# Patient Record
Sex: MALE | Race: WHITE | NOT HISPANIC OR LATINO | Employment: OTHER | URBAN - METROPOLITAN AREA
[De-identification: names, ages, dates, MRNs, and addresses within clinical notes are randomized per-mention and may not be internally consistent; named-entity substitution may affect disease eponyms.]

---

## 2017-11-14 ENCOUNTER — HOSPITAL ENCOUNTER (EMERGENCY)
Facility: HOSPITAL | Age: 22
Discharge: HOME | End: 2017-11-14
Attending: EMERGENCY MEDICINE
Payer: OTHER GOVERNMENT

## 2017-11-14 VITALS
HEIGHT: 67 IN | TEMPERATURE: 98 F | BODY MASS INDEX: 21.97 KG/M2 | RESPIRATION RATE: 16 BRPM | DIASTOLIC BLOOD PRESSURE: 73 MMHG | SYSTOLIC BLOOD PRESSURE: 132 MMHG | WEIGHT: 140 LBS | HEART RATE: 75 BPM | OXYGEN SATURATION: 100 %

## 2017-11-14 DIAGNOSIS — R42 DIZZINESS: ICD-10-CM

## 2017-11-14 DIAGNOSIS — H81.10 BENIGN PAROXYSMAL POSITIONAL VERTIGO, UNSPECIFIED LATERALITY: Primary | ICD-10-CM

## 2017-11-14 PROCEDURE — 93005 ELECTROCARDIOGRAM TRACING: CPT

## 2017-11-14 PROCEDURE — 93010 ELECTROCARDIOGRAM REPORT: CPT | Mod: ,,, | Performed by: INTERNAL MEDICINE

## 2017-11-14 PROCEDURE — 25000003 PHARM REV CODE 250: Performed by: PHYSICIAN ASSISTANT

## 2017-11-14 PROCEDURE — 99283 EMERGENCY DEPT VISIT LOW MDM: CPT

## 2017-11-14 RX ORDER — MECLIZINE HYDROCHLORIDE 25 MG/1
50 TABLET ORAL
Status: DISCONTINUED | OUTPATIENT
Start: 2017-11-14 | End: 2017-11-14 | Stop reason: HOSPADM

## 2017-11-14 RX ORDER — MECLIZINE HYDROCHLORIDE 25 MG/1
25 TABLET ORAL 3 TIMES DAILY PRN
Qty: 20 TABLET | Refills: 0 | Status: SHIPPED | OUTPATIENT
Start: 2017-11-14

## 2017-11-14 NOTE — ED PROVIDER NOTES
Encounter Date: 11/14/2017    SCRIBE #1 NOTE: I, Beth Tellez, am scribing for, and in the presence of,  Cole Barnett PA-C. I have scribed the following portions of the note - Other sections scribed: HPI and ROS.       History     Chief Complaint   Patient presents with    Dizziness     States he was dizzy last night and today sometimes the room feels like it is spinning     CC: Dizziness    HPI: This 22 y.o male who has IBS and DVT presents to the ED c/o acute onset, intermittent episodes of dizziness described as a room spinning sensation that began last night.  Patient reports being awaken 4-5 times out of his sleep as a result of his dizziness.  Patient reports the dizziness is worse with his head lying back.  Patient also reports of a fullness to his left ear, decreased hearing, and an episode of paresthesias to his bilateral fingertips.  Patient reports [place] an episode of dizziness after leaning back in a chair as well as reporting having an episode of a headache.  Patient reports he was then advised to come to the ED for further evaluation.  Patient denies fever, chills, emesis, neck pain, back pain, or any other associated symptoms.  No prior tx.  No alleviating factors.      The history is provided by the patient. No  was used.     Review of patient's allergies indicates:  No Known Allergies  Past Medical History:   Diagnosis Date    IBS (irritable bowel syndrome)     SVT (supraventricular tachycardia) 2015     Past Surgical History:   Procedure Laterality Date    ICD placement Left 2016     History reviewed. No pertinent family history.  Social History   Substance Use Topics    Smoking status: Never Smoker    Smokeless tobacco: Never Used    Alcohol use No     Review of Systems   Constitutional: Negative for chills and fever.   HENT: Positive for hearing loss. Negative for congestion, ear discharge, ear pain, rhinorrhea  and sore throat.         (+) left ear fullness   Eyes: Negative for pain.   Respiratory: Negative for cough and shortness of breath.    Gastrointestinal: Negative for abdominal pain, diarrhea, nausea and vomiting.   Musculoskeletal: Negative for back pain.   Skin: Negative for rash.   Neurological: Positive for dizziness and headaches. Negative for weakness and numbness.        (+) paresthesias       Physical Exam     Initial Vitals [11/14/17 0917]   BP Pulse Resp Temp SpO2   (!) 159/86 79 16 98.2 °F (36.8 °C) 100 %      MAP       110.33         Physical Exam    Vitals reviewed.  Constitutional: He appears well-developed and well-nourished. He is not diaphoretic. No distress.   HENT:   Head: Normocephalic and atraumatic.   Right Ear: External ear normal.   Left Ear: External ear normal.   Nose: Nose normal.   Mouth/Throat: Oropharynx is clear and moist. No oropharyngeal exudate.   Eyes: Conjunctivae and EOM are normal. Pupils are equal, round, and reactive to light. No scleral icterus. Right eye exhibits no nystagmus. Left eye exhibits no nystagmus.   Neck: Normal range of motion. Neck supple.   Cardiovascular: Normal rate, regular rhythm, normal heart sounds and intact distal pulses.   Pulmonary/Chest: Breath sounds normal. No respiratory distress. He has no wheezes. He has no rhonchi. He has no rales. He exhibits no tenderness.   Musculoskeletal: Normal range of motion.   Neurological: He is alert and oriented to person, place, and time. He has normal strength. No cranial nerve deficit. He displays a negative Romberg sign. Coordination and gait normal. GCS eye subscore is 4. GCS verbal subscore is 5. GCS motor subscore is 6.   Skin: Skin is warm and dry. No rash noted. No erythema.         ED Course   Procedures  Labs Reviewed - No data to display  EKG Readings: (Independently Interpreted)   Initial Reading: No STEMI. Rhythm: Sinus Arrhythmia. Ectopy: No Ectopy. Conduction: Normal. ST Segments: Normal ST Segments.  Axis: Normal.          Medical Decision Making:   Initial Assessment:   22-year-old male with SVT and IBS complains of episodes of vertigo yesterday and today with change in head position that resolved with return to previous position.  He denies headache, fever, vomiting, head trauma, or gait disturbance.  Differential Diagnosis:   BPPV, labyrinthitis, Ménière's disease, and less likely central vertigo  ED Management:  Patient presents well-appearing in no distress, afebrile, with vitals within normal limits.  HEENT exam is unremarkable.  Cranial nerves grossly intact and normal cerebellar function test.  Patient likely with BPPV and will treat with meclizine.  Patient discharged with return precautions and ENT follow-up information.  He verbalized understanding and agreed with plan.  Case discussed with Dr. Ha.            Scribe Attestation:   Scribe #1: I performed the above scribed service and the documentation accurately describes the services I performed. I attest to the accuracy of the note.    Attending Attestation:           Physician Attestation for Scribe:  Physician Attestation Statement for Scribe #1: I, Cole Barnett PA-C, reviewed documentation, as scribed by Beth Tellez in my presence, and it is both accurate and complete.                 ED Course      Clinical Impression:   The primary encounter diagnosis was Benign paroxysmal positional vertigo, unspecified laterality. A diagnosis of Dizziness was also pertinent to this visit.                           Cole Barnett PA-C  11/14/17 0834

## 2017-11-14 NOTE — ED TRIAGE NOTES
Pt. Stated his dizziness started last night and he had multiple episodes. Pt. Reports he was awoke due to the dizziness.

## 2017-12-04 ENCOUNTER — HOSPITAL ENCOUNTER (EMERGENCY)
Facility: HOSPITAL | Age: 22
Discharge: PSYCHIATRIC OR SUBSTANCE USE FACILITY | End: 2017-12-04
Attending: EMERGENCY MEDICINE
Payer: OTHER GOVERNMENT

## 2017-12-04 VITALS
TEMPERATURE: 98 F | SYSTOLIC BLOOD PRESSURE: 122 MMHG | OXYGEN SATURATION: 100 % | RESPIRATION RATE: 18 BRPM | HEART RATE: 75 BPM | BODY MASS INDEX: 21.82 KG/M2 | HEIGHT: 67 IN | WEIGHT: 139 LBS | DIASTOLIC BLOOD PRESSURE: 74 MMHG

## 2017-12-04 DIAGNOSIS — R46.89 AGGRESSIVE BEHAVIOR: ICD-10-CM

## 2017-12-04 DIAGNOSIS — F99 PSYCHIATRIC COMPLAINT: ICD-10-CM

## 2017-12-04 DIAGNOSIS — R45.4 ANGER REACTION: Primary | ICD-10-CM

## 2017-12-04 LAB
ALBUMIN SERPL BCP-MCNC: 4.8 G/DL
ALP SERPL-CCNC: 68 U/L
ALT SERPL W/O P-5'-P-CCNC: 13 U/L
AMPHET+METHAMPHET UR QL: NEGATIVE
ANION GAP SERPL CALC-SCNC: 12 MMOL/L
APAP SERPL-MCNC: <3 UG/ML
AST SERPL-CCNC: 18 U/L
BARBITURATES UR QL SCN>200 NG/ML: NEGATIVE
BASOPHILS # BLD AUTO: 0.03 K/UL
BASOPHILS NFR BLD: 0.5 %
BENZODIAZ UR QL SCN>200 NG/ML: NEGATIVE
BILIRUB SERPL-MCNC: 0.4 MG/DL
BILIRUB UR QL STRIP: NEGATIVE
BUN SERPL-MCNC: 18 MG/DL
BZE UR QL SCN: NEGATIVE
CALCIUM SERPL-MCNC: 9.9 MG/DL
CANNABINOIDS UR QL SCN: NEGATIVE
CHLORIDE SERPL-SCNC: 102 MMOL/L
CLARITY UR: CLEAR
CO2 SERPL-SCNC: 24 MMOL/L
COLOR UR: YELLOW
CREAT SERPL-MCNC: 1.1 MG/DL
CREAT UR-MCNC: 106.9 MG/DL
DIFFERENTIAL METHOD: NORMAL
EOSINOPHIL # BLD AUTO: 0.1 K/UL
EOSINOPHIL NFR BLD: 2.2 %
ERYTHROCYTE [DISTWIDTH] IN BLOOD BY AUTOMATED COUNT: 12.7 %
EST. GFR  (AFRICAN AMERICAN): >60 ML/MIN/1.73 M^2
EST. GFR  (NON AFRICAN AMERICAN): >60 ML/MIN/1.73 M^2
ETHANOL SERPL-MCNC: <10 MG/DL
GLUCOSE SERPL-MCNC: 94 MG/DL
GLUCOSE UR QL STRIP: NEGATIVE
HCT VFR BLD AUTO: 44.9 %
HGB BLD-MCNC: 15.3 G/DL
HGB UR QL STRIP: NEGATIVE
KETONES UR QL STRIP: NEGATIVE
LEUKOCYTE ESTERASE UR QL STRIP: NEGATIVE
LYMPHOCYTES # BLD AUTO: 1.9 K/UL
LYMPHOCYTES NFR BLD: 31.3 %
MCH RBC QN AUTO: 28 PG
MCHC RBC AUTO-ENTMCNC: 34.1 G/DL
MCV RBC AUTO: 82 FL
METHADONE UR QL SCN>300 NG/ML: NEGATIVE
MONOCYTES # BLD AUTO: 0.7 K/UL
MONOCYTES NFR BLD: 11.2 %
NEUTROPHILS # BLD AUTO: 3.3 K/UL
NEUTROPHILS NFR BLD: 54.6 %
NITRITE UR QL STRIP: NEGATIVE
OPIATES UR QL SCN: NEGATIVE
PCP UR QL SCN>25 NG/ML: NEGATIVE
PH UR STRIP: 7 [PH] (ref 5–8)
PLATELET # BLD AUTO: 241 K/UL
PMV BLD AUTO: 10.7 FL
POTASSIUM SERPL-SCNC: 3.7 MMOL/L
PROT SERPL-MCNC: 8.6 G/DL
PROT UR QL STRIP: NEGATIVE
RBC # BLD AUTO: 5.46 M/UL
SODIUM SERPL-SCNC: 138 MMOL/L
SP GR UR STRIP: 1.01 (ref 1–1.03)
TOXICOLOGY INFORMATION: NORMAL
TSH SERPL DL<=0.005 MIU/L-ACNC: 1.01 UIU/ML
URN SPEC COLLECT METH UR: NORMAL
UROBILINOGEN UR STRIP-ACNC: NEGATIVE EU/DL
WBC # BLD AUTO: 6 K/UL

## 2017-12-04 PROCEDURE — 93005 ELECTROCARDIOGRAM TRACING: CPT

## 2017-12-04 PROCEDURE — 80053 COMPREHEN METABOLIC PANEL: CPT

## 2017-12-04 PROCEDURE — 80329 ANALGESICS NON-OPIOID 1 OR 2: CPT

## 2017-12-04 PROCEDURE — 81003 URINALYSIS AUTO W/O SCOPE: CPT

## 2017-12-04 PROCEDURE — 80320 DRUG SCREEN QUANTALCOHOLS: CPT

## 2017-12-04 PROCEDURE — 80307 DRUG TEST PRSMV CHEM ANLYZR: CPT

## 2017-12-04 PROCEDURE — 84443 ASSAY THYROID STIM HORMONE: CPT

## 2017-12-04 PROCEDURE — 85025 COMPLETE CBC W/AUTO DIFF WBC: CPT

## 2017-12-04 PROCEDURE — 99285 EMERGENCY DEPT VISIT HI MDM: CPT

## 2017-12-04 PROCEDURE — 93010 ELECTROCARDIOGRAM REPORT: CPT | Mod: ,,, | Performed by: INTERNAL MEDICINE

## 2017-12-04 NOTE — ED NOTES
PT HAS BEEN CHANGED INTO HOSPITAL PROVIDED CLOTHES. ALL OF PT BELONGINGS HAS BEEN COLLECTED, SEARCHED,AND DOCUMENTED.

## 2017-12-04 NOTE — ED NOTES
Pt changed into paper scrubs, belongings placed in bag outside of room with hawa Combs. Pt was cooperative.

## 2017-12-05 NOTE — ED NOTES
Pt. Refuses to sign any paperwork. PEC Acknowledgement or transfer consent. However declined politely.

## 2017-12-05 NOTE — ED NOTES
Pt. Accpeted at [facility] by Dr. Phoenix Resendiz. Report to be called to NAKIA Hernandez he can be reached at [phone] and asking for [address].

## 2017-12-05 NOTE — ED NOTES
Meeta Hackett envelope #[id number] placed in charge nurse desk drawer, house supervisor, Aubrey,  Has been called to pick it up.

## 2017-12-05 NOTE — ED NOTES
Pt. Escorted to [employer] vehicle by myself and security, en route to [facility].

## 2017-12-05 NOTE — ED PROVIDER NOTES
"Encounter Date: 12/4/2017    SCRIBE #1 NOTE: I, Emily Gee, am scribing for, and in the presence of,  Darwin Riley III, MD . I have scribed the following portions of the note - Other sections scribed: HPI and ROS .       History     Chief Complaint   Patient presents with    Psychiatric Evaluation     Sent for psych evaluation from job. Pt sts "I snapped at someone". Denies SI, HI.      CC:Psychiatric Evaluation.   History obtained from patient and sergeants.   Pt arrived via personal transportation.     HPI: This 22 y.o. Male, who has IBS, SVT, and PTSD, presents to the ED for psychiatric evaluation. The patient is in the  and presents with two supervisors who report that the patient yelled at another coworker, which concerned them as the patient's outbursts have been becoming more frequent. One supervisor states, "It's getting to the point where everyone is uncomfortable working with him. He's a smart kid. He knows what to say to make it seem like he's okay." The patient attests to the fact that he yelled at his coworker. The patient describes the event with the following:" I was trying to get all of my doctors appointments in line. My coworker told me that the mission is always the priority and I told him that my medical appointments were the priority, only I used more of an extreme vocabulary." Supervisors add that when asked if the patient felt like harming anyone, he stated, "Not today." The patient counters, "In my defense, that's just my sense of humor. I get I shouldn't have said that at that time." When supervisors step out of the room the patient states, "I just really don't like my job. Well, actually, I genuinely hate my job and I just don't like being around my coworkers. It's just that I know where I want to be, but I have all of these medical things that have kind of put me in limbo, so I'm just sitting around waiting and that frustrates me." He adds," I find it really hard to get out of " "bed in the morning to go to work." Furthermore he states, "I just really don't trust anyone. I have a hard time getting close to anyone and I feel emotionally detached from people. I definitely don't like talking to anyone in uniform." The patient states other than typical abdominal pain and discomfort with history of IBS, he has no medical complaints at this time. He further denies fever, chills, suicidal ideation, homicidal ideation, or visual/auditory hallucinations. His psychiatrist is Dr. Sylvester Younger.       The history is provided by the patient ( supervisors ).     Review of patient's allergies indicates:  No Known Allergies  Past Medical History:   Diagnosis Date    IBS (irritable bowel syndrome)     PTSD (post-traumatic stress disorder)     SVT (supraventricular tachycardia) 2015     Past Surgical History:   Procedure Laterality Date    HERNIA REPAIR      ICD placement Left 2016     History reviewed. No pertinent family history.  Social History   Substance Use Topics    Smoking status: Never Smoker    Smokeless tobacco: Never Used    Alcohol use No     Review of Systems   Constitutional: Negative for chills and fever.   HENT: Negative for congestion, rhinorrhea and sore throat.    Eyes: Negative for redness.   Respiratory: Negative for cough and shortness of breath.    Cardiovascular: Negative for chest pain.   Gastrointestinal: Positive for abdominal pain. Negative for diarrhea, nausea and vomiting.   Genitourinary: Negative for difficulty urinating and dysuria.   Skin: Negative for rash.   Neurological: Negative for dizziness and headaches.   Psychiatric/Behavioral: Negative for confusion, hallucinations, self-injury and suicidal ideas.       Physical Exam     Initial Vitals [12/04/17 1614]   BP Pulse Resp Temp SpO2   (!) 144/87 99 18 97.6 °F (36.4 °C) 99 %      MAP       106         Physical Exam    Constitutional: He appears well-developed and well-nourished. He is not diaphoretic. No " distress.   HENT:   Head: Normocephalic and atraumatic.   Right Ear: External ear normal.   Left Ear: External ear normal.   Eyes: Conjunctivae and EOM are normal. Pupils are equal, round, and reactive to light.   Neck: Normal range of motion.   Cardiovascular: Normal rate, regular rhythm and normal heart sounds. Exam reveals no friction rub.    No murmur heard.  Pulmonary/Chest: Breath sounds normal. No respiratory distress. He has no wheezes. He has no rhonchi. He has no rales.   Abdominal: He exhibits no distension.   Musculoskeletal: He exhibits no edema.   Neurological: He is alert. GCS eye subscore is 4. GCS verbal subscore is 5. GCS motor subscore is 6.   Pressured speech   Skin: Skin is warm and dry.   Psychiatric: He has a normal mood and affect. Thought content normal.         ED Course   Procedures  Labs Reviewed   COMPREHENSIVE METABOLIC PANEL - Abnormal; Notable for the following:        Result Value    Total Protein 8.6 (*)     All other components within normal limits   ACETAMINOPHEN LEVEL - Abnormal; Notable for the following:     Acetaminophen (Tylenol), Serum <3.0 (*)     All other components within normal limits   CBC W/ AUTO DIFFERENTIAL   TSH   URINALYSIS   DRUG SCREEN PANEL, URINE EMERGENCY   ALCOHOL,MEDICAL (ETHANOL)             Medical Decision Making:   Initial Assessment:   22-year-old male brought in from [facility] for evaluation of aggressive behaviors and severe anger reactions, which reportedly have become more frequent.  Patient's companions from the [facility] report patient has become an increasing concern on the face, making statements that sound like passive SI and HI.  When asked directly, patient rationalizes his behaviors and feelings and in a way that the gentamicin is them and make symptoms sound within the realm of normal behavior.  However, patient clearly has pressured speech, an abnormal intensity, along with statements suggesting passive suicidal ideation, anhedonia, and  severe difficulty coping with his current situation.  Physical examination otherwise unremarkable.  I believe the patient is very high risk of position of his emotional/mental status, with possible resulting injurious behaviors.  Will place on a physicians emergency certificate after medical screening evaluation.  ED Management:  Workup unremarkable.  At this time the patient is medically cleared for psychiatric evaluation and treatment.            Scribe Attestation:   Scribe #1: I performed the above scribed service and the documentation accurately describes the services I performed. I attest to the accuracy of the note.    Attending Attestation:           Physician Attestation for Scribe:  Physician Attestation Statement for Scribe #1: I, Emily Gee, reviewed documentation, as scribed by Darwin Riley III, MD  in my presence, and it is both accurate and complete.                 ED Course      Clinical Impression:   The primary encounter diagnosis was Anger reaction. Diagnoses of Psychiatric complaint and Aggressive behavior were also pertinent to this visit.                           Darwin Riley III, MD  12/04/17 6623
